# Patient Record
Sex: FEMALE | Race: WHITE
[De-identification: names, ages, dates, MRNs, and addresses within clinical notes are randomized per-mention and may not be internally consistent; named-entity substitution may affect disease eponyms.]

---

## 2019-02-03 ENCOUNTER — HOSPITAL ENCOUNTER (EMERGENCY)
Dept: HOSPITAL 62 - ER | Age: 59
Discharge: HOME | End: 2019-02-03
Payer: COMMERCIAL

## 2019-02-03 VITALS — SYSTOLIC BLOOD PRESSURE: 132 MMHG | DIASTOLIC BLOOD PRESSURE: 83 MMHG

## 2019-02-03 DIAGNOSIS — R11.0: ICD-10-CM

## 2019-02-03 DIAGNOSIS — R51: Primary | ICD-10-CM

## 2019-02-03 DIAGNOSIS — Z88.0: ICD-10-CM

## 2019-02-03 DIAGNOSIS — H53.149: ICD-10-CM

## 2019-02-03 LAB
ADD MANUAL DIFF: NO
ALBUMIN SERPL-MCNC: 4.5 G/DL (ref 3.5–5)
ALP SERPL-CCNC: 73 U/L (ref 38–126)
ALT SERPL-CCNC: 67 U/L (ref 9–52)
ANION GAP SERPL CALC-SCNC: 10 MMOL/L (ref 5–19)
AST SERPL-CCNC: 55 U/L (ref 14–36)
BASOPHILS # BLD AUTO: 0 10^3/UL (ref 0–0.2)
BASOPHILS NFR BLD AUTO: 0.4 % (ref 0–2)
BILIRUB DIRECT SERPL-MCNC: 0.2 MG/DL (ref 0–0.4)
BILIRUB SERPL-MCNC: 0.3 MG/DL (ref 0.2–1.3)
BUN SERPL-MCNC: 10 MG/DL (ref 7–20)
CALCIUM: 9.5 MG/DL (ref 8.4–10.2)
CHLORIDE SERPL-SCNC: 108 MMOL/L (ref 98–107)
CO2 SERPL-SCNC: 24 MMOL/L (ref 22–30)
EOSINOPHIL # BLD AUTO: 0.1 10^3/UL (ref 0–0.6)
EOSINOPHIL NFR BLD AUTO: 2.1 % (ref 0–6)
ERYTHROCYTE [DISTWIDTH] IN BLOOD BY AUTOMATED COUNT: 13.5 % (ref 11.5–14)
GLUCOSE SERPL-MCNC: 108 MG/DL (ref 75–110)
HCT VFR BLD CALC: 43 % (ref 36–47)
HGB BLD-MCNC: 14.8 G/DL (ref 12–15.5)
INR PPP: 0.9
LYMPHOCYTES # BLD AUTO: 1.4 10^3/UL (ref 0.5–4.7)
LYMPHOCYTES NFR BLD AUTO: 22.4 % (ref 13–45)
MCH RBC QN AUTO: 31.4 PG (ref 27–33.4)
MCHC RBC AUTO-ENTMCNC: 34.5 G/DL (ref 32–36)
MCV RBC AUTO: 91 FL (ref 80–97)
MONOCYTES # BLD AUTO: 0.5 10^3/UL (ref 0.1–1.4)
MONOCYTES NFR BLD AUTO: 7.5 % (ref 3–13)
NEUTROPHILS # BLD AUTO: 4.3 10^3/UL (ref 1.7–8.2)
NEUTS SEG NFR BLD AUTO: 67.6 % (ref 42–78)
PLATELET # BLD: 205 10^3/UL (ref 150–450)
POTASSIUM SERPL-SCNC: 4.6 MMOL/L (ref 3.6–5)
PROT SERPL-MCNC: 7 G/DL (ref 6.3–8.2)
PROTHROMBIN TIME: 12.6 SEC (ref 11.4–15.4)
RBC # BLD AUTO: 4.73 10^6/UL (ref 3.72–5.28)
SODIUM SERPL-SCNC: 142.1 MMOL/L (ref 137–145)
TOTAL CELLS COUNTED % (AUTO): 100 %
WBC # BLD AUTO: 6.3 10^3/UL (ref 4–10.5)

## 2019-02-03 PROCEDURE — 80053 COMPREHEN METABOLIC PANEL: CPT

## 2019-02-03 PROCEDURE — 36415 COLL VENOUS BLD VENIPUNCTURE: CPT

## 2019-02-03 PROCEDURE — 85610 PROTHROMBIN TIME: CPT

## 2019-02-03 PROCEDURE — 85025 COMPLETE CBC W/AUTO DIFF WBC: CPT

## 2019-02-03 PROCEDURE — 70450 CT HEAD/BRAIN W/O DYE: CPT

## 2019-02-03 PROCEDURE — 99284 EMERGENCY DEPT VISIT MOD MDM: CPT

## 2019-02-03 PROCEDURE — 96375 TX/PRO/DX INJ NEW DRUG ADDON: CPT

## 2019-02-03 PROCEDURE — 96361 HYDRATE IV INFUSION ADD-ON: CPT

## 2019-02-03 PROCEDURE — 96374 THER/PROPH/DIAG INJ IV PUSH: CPT

## 2019-02-03 PROCEDURE — 96376 TX/PRO/DX INJ SAME DRUG ADON: CPT

## 2019-02-03 NOTE — RADIOLOGY REPORT (SQ)
EXAM DESCRIPTION:  CT HEAD WITHOUT



COMPLETED DATE/TIME:  2/3/2019 4:27 pm



REASON FOR STUDY:  Sudden onset severe headache, 2016 SAH



COMPARISON:  None.



TECHNIQUE:  Axial images acquired through the brain without intravenous contrast.  Images reviewed wi
th bone, brain and subdural windows.  Additional sagittal and coronal reconstructions were generated.
 Images stored on PACS.

All CT scanners at this facility use dose modulation, iterative reconstruction, and/or weight based d
osing when appropriate to reduce radiation dose to as low as reasonably achievable (ALARA).

[facility]: Dose Right  [facility]: CareDose    [facility]: Dose Right    [facility]: Teradose 4D    [facility]: Smart Technologies



RADIATION DOSE:  CT Rad equipment meets quality standard of care and radiation dose reduction techniq
ues were employed. CTDIvol: 53.2 mGy. DLP: 1070 mGy-cm. mGy.



LIMITATIONS:  None.



FINDINGS:  VENTRICLES: Normal size and contour.

CEREBRUM: No masses.  No hemorrhage.  No midline shift.  No evidence for acute infarction. Normal gra
y/white matter differentiation. No areas of low density in the white matter.

CEREBELLUM: No masses.  No hemorrhage.  No alteration of density.  No evidence for acute infarction.

EXTRAAXIAL SPACES: No fluid collections.  No masses.

ORBITS AND GLOBE: No intra- or extraconal masses.  Normal contour of globe without masses.

CALVARIUM: No fracture.

PARANASAL SINUSES: Mucosal thickening of the right maxillary sinus.

SOFT TISSUES: No mass or hematoma.

OTHER: No other significant finding.



IMPRESSION:  1. No acute intracranial pathology.  No noncontrast CT findings to explain headache.

2.  Right maxillary sinus disease.  Correlate for evidence of sinusitis.

EVIDENCE OF ACUTE STROKE: NO.



COMMENT:  Quality ID # [id number]: Final reports with documentation of one or more dose reduction techniques
 (e.g., Automated exposure control, adjustment of the mA and/or kV according to patient size, use of 
iterative reconstruction technique)



TECHNICAL DOCUMENTATION:  JOB ID:  [id number]

 2011 [employer]- All Rights Reserved



Reading location - IP/workstation name: [email or web address]

## 2019-02-03 NOTE — ER DOCUMENT REPORT
Entered by BRENTON GOVEA SCRIBE  02/03/19 0688 





Acting as scribe for:VIVIAN WASHINGTON MD





ED General





- General


Stated Complaint: HEADACHE


Time Seen by Provider: 02/03/19 16:00


Primary Care Provider: 


BERTRAM HERNANDEZ [NO LILIANA MD] - Follow up as needed


Mode of Arrival: Ambulatory


Information source: Patient


Notes: 


Patient is a 58 year old female presenting to the emergency department 

complaining of a sudden onset headache onset around 1440 today. Patient states 

she woke up from her usual nap and proceeded to have a severe headache, nausea, 

dry heaving, double vision and photophobia. Patient states this is the 2nd worst

headache of her life. She states when she had her 1st worst headache she was 

diagnosed with a subarachnoid hemorrhage. She states her headache located 

globally and is exacerbated with any kind of movement. Patient states she still 

has double vision and nausea although it is not has severe as before. 





Patient states in 2016 she was admitted to [facility] in [place], [place] for

a subarachnoid hemorrhage.  She reports she had a bur hole placed in a tube put 

in to drain fluid.  I suspect this was a ventriculostomy drain.





Patient states a bleeding source was never found, but she describes them telling

her the source may have been at a bifurcation of vessels around the optic chiasm

region.





- Related Data


Allergies/Adverse Reactions: 


                                        





Penicillins Allergy (Verified 02/03/19 16:33)


   











Past Medical History





- General


Information source: Patient





- Social History


Smoking Status: Never Smoker


Cigarette use (# per day): No


Chew tobacco use (# tins/day): No


Smoking Education Provided: No


Frequency of alcohol use: None


Family History: Reviewed & Not Pertinent





Review of Systems





- Review of Systems


Constitutional: No symptoms reported


EENT: See HPI, Blurred vision


Cardiovascular: No symptoms reported


Respiratory: No symptoms reported


Gastrointestinal: See HPI, Nausea


Genitourinary: No symptoms reported


Female Genitourinary: No symptoms reported


Musculoskeletal: No symptoms reported


Skin: No symptoms reported


Hematologic/Lymphatic: No symptoms reported


Neurological/Psychological: See HPI, Headaches





Physical Exam





- Vital signs


Vitals: 


                                        











Resp BP Pulse Ox


 


 11 L  151/87 H  96 


 


 02/03/19 16:08  02/03/19 16:08  02/03/19 16:08














- Notes


Notes: 


GENERAL: Alert, appears uncomfortable, interacts well. No acute distress.


HEAD: Normocephalic, atraumatic. Complains of pain with chin to chest to test 

testing.


EYES: Pupils equal, round, and reactive to light. Extraocular movements intact.


ENT: Oral mucosa moist, tongue midline. 


NECK: Full range of motion. Supple. Trachea midline.


LUNGS: Clear to auscultation bilaterally, no wheezes, rales, or rhonchi. No 

respiratory distress.


HEART: Regular rate and rhythm. No murmurs, gallops, or rubs. Blood pressure is 

156 systolic on monitor.


ABDOMEN: Soft, non-tender. Non-distended. Bowel sounds present in all 4 

quadrants.


EXTREMITIES: Moves all 4 extremities spontaneously. 


NEUROLOGICAL: Alert and oriented x3. Normal speech. 


PSYCH: Normal affect, normal mood.


SKIN: Warm, dry, normal turgor. No rashes or lesions noted.











Course





- Re-evaluation


Re-evalutation: 





02/03/19 17:52


About 30 minutes after the Compazine 5 mg and Benadryl 25 mg IV, patient states 

the headache is better but is still present.  She also reports she is quite 

nauseous and is afraid to fall asleep because he might throw up.  She will be 

given another 5 mg Compazine IV, and a drill 25 mg IV and Zofran 8 mg IV.


02/03/19 18:59


At this time the patient states her headache is now about a 1.  She states she 

really wants to go home because she is hungry and wants to eat.


We will continue to monitor her, and probably repeat the scan to be absolutely 

certain there is no evidence of SAH.


02/03/19 20:39


A repeat CT scan of the head approximately 4 hours after the first scan remains 

negative for bleeding or any other abnormality.


02/03/19 20:46


Reevaluation of the patient shows she complains that she still has a little bit 

of a headache, it is now more in the left retro-orbital area.  She thinks it is 

due to being hungry.  Palpating the posterior cervical muscles shows that they 

are quite tender, especially on the left side.  There is also tenderness at the 

nuchal ridge and into the occipital scalp muscles.  I have reassured the patient

that the posterior cervical and scalp muscle tenderness does not indicate an 

intracranial issue, but confirms suspicions that this is probably a muscle 

contraction headache with some migraine component.








- Vital Signs


Vital signs: 


                                        











Temp Pulse Resp BP Pulse Ox


 


       22 H  119/66   94 


 


       02/03/19 18:01  02/03/19 18:01  02/03/19 18:01














- Laboratory


Result Diagrams: 


                                 02/03/19 16:28





                                 02/03/19 16:28


Laboratory results interpreted by me: 


                                        











  02/03/19





  16:28


 


Chloride  108 H


 


AST  55 H


 


ALT  67 H














Discharge





- Discharge


Clinical Impression: 


Headache


Qualifiers:


 Headache type: unspecified Headache chronicity pattern: acute headache 

Intractability: not intractable Qualified Code(s): R51 - Headache





Condition: Stable


Disposition: HOME, SELF-CARE


Additional Instructions: 


Headache:





     The physician does not feel that the headache you are experiencing has a 

serious underlying cause.  Most headaches are due to emotional stress, with 

resultant muscle tension (tension headache). Occasionally, headaches are 

secondary to changes in the blood vessels of the scalp (vascular headache and 

migraine headache).  Sometimes, a headache is the first symptom of another 

developing illness, such as a viral infection.  You have no evidence of stroke, 

bleeding, meningitis, or other serious cause of your headache.


     The treatment of headaches varies with the severity and cause of the pain. 

Not all headaches need pain shots.  In fact, there is evidence that using 

narcotics for headaches may make them worse in the long run.  The physician will

determine the therapy that's in your best interest.


     If you develop a fever, if the headache is different from any you've 

previously experienced, or if the headache progressively worsens, then call your

physician at once or go to the emergency room.








***********************************

*********************************************************************************





Your physical exam suggests that this is a muscle contraction headache with 

possible migraine component.


Serial CT scans of the brain do not show any evidence of bleeding.





Rest in a cool, dark, quiet room.


Take Tylenol and ibuprofen for your headache.





Follow-up with your primary care provider tomorrow for recheck if not feeling 

better.





RETURN TO THE EMERGENCY ROOM IF ANY NEW OR WORSENING SYMPTOMS.











I personally performed the services described in the documentation, reviewed and

edited the documentation which was dictated to the scribe in my presence, and it

accurately records my words and actions.

## 2019-02-03 NOTE — RADIOLOGY REPORT (SQ)
EXAM DESCRIPTION: 



CT HEAD WITHOUT IV CONTRAST



COMPLETED DATE/TME:  02/03/2019 20:07



CLINICAL HISTORY: 



58 years, Female, follow up to exclude subarachnoid hemorrhage



This exam was performed according to our departmental

dose-optimization program which includes automated exposure

control, adjustment of the mA and/or kVp according to patient

size and/or use of iterative reconstruction technique where

applicable.



FINDINGS: No acute intracranial hemorrhage, mass effect or

midline shift. No extra-axial fluid collections. Ventricles and

subarachnoid spaces are preserved. Gray-white matter

differentiation is preserved. Visualized paranasal sinuses and

the mastoid air cells demonstrate opacification of the left

maxillary sinus. The mastoid air cells are clear. The skull is

intact.



IMPRESSION:



No acute intracranial hemorrhage.